# Patient Record
Sex: MALE | Race: WHITE | NOT HISPANIC OR LATINO | Employment: STUDENT | URBAN - METROPOLITAN AREA
[De-identification: names, ages, dates, MRNs, and addresses within clinical notes are randomized per-mention and may not be internally consistent; named-entity substitution may affect disease eponyms.]

---

## 2023-06-16 ENCOUNTER — OFFICE VISIT (OUTPATIENT)
Dept: PRIMARY CARE | Facility: CLINIC | Age: 23
End: 2023-06-16
Payer: COMMERCIAL

## 2023-06-16 VITALS
RESPIRATION RATE: 16 BRPM | SYSTOLIC BLOOD PRESSURE: 115 MMHG | HEART RATE: 88 BPM | OXYGEN SATURATION: 98 % | TEMPERATURE: 98 F | DIASTOLIC BLOOD PRESSURE: 75 MMHG

## 2023-06-16 DIAGNOSIS — Z86.19 FREQUENT INFECTIONS: Primary | ICD-10-CM

## 2023-06-16 DIAGNOSIS — R13.10 ODYNOPHAGIA: ICD-10-CM

## 2023-06-16 PROBLEM — Z00.00 WELL ADULT EXAM: Status: ACTIVE | Noted: 2023-06-16

## 2023-06-16 PROBLEM — J30.9 ALLERGIC RHINITIS: Status: ACTIVE | Noted: 2023-06-16

## 2023-06-16 PROBLEM — F41.0 PANIC ATTACK: Status: ACTIVE | Noted: 2023-06-16

## 2023-06-16 PROBLEM — R19.7 DIARRHEA: Status: RESOLVED | Noted: 2023-06-16 | Resolved: 2023-06-16

## 2023-06-16 PROBLEM — J45.909 ASTHMA (HHS-HCC): Status: ACTIVE | Noted: 2023-06-16

## 2023-06-16 LAB
ALANINE AMINOTRANSFERASE (SGPT) (U/L) IN SER/PLAS: 27 U/L (ref 10–52)
ALBUMIN (G/DL) IN SER/PLAS: 4.6 G/DL (ref 3.4–5)
ALKALINE PHOSPHATASE (U/L) IN SER/PLAS: 81 U/L (ref 33–120)
ANION GAP IN SER/PLAS: 13 MMOL/L (ref 10–20)
ASPARTATE AMINOTRANSFERASE (SGOT) (U/L) IN SER/PLAS: 24 U/L (ref 9–39)
BILIRUBIN TOTAL (MG/DL) IN SER/PLAS: 0.3 MG/DL (ref 0–1.2)
CALCIUM (MG/DL) IN SER/PLAS: 9.5 MG/DL (ref 8.6–10.3)
CARBON DIOXIDE, TOTAL (MMOL/L) IN SER/PLAS: 27 MMOL/L (ref 21–32)
CHLORIDE (MMOL/L) IN SER/PLAS: 99 MMOL/L (ref 98–107)
CREATININE (MG/DL) IN SER/PLAS: 1.05 MG/DL (ref 0.5–1.3)
ERYTHROCYTE DISTRIBUTION WIDTH (RATIO) BY AUTOMATED COUNT: 11.5 % (ref 11.5–14.5)
ERYTHROCYTE MEAN CORPUSCULAR HEMOGLOBIN CONCENTRATION (G/DL) BY AUTOMATED: 33.1 G/DL (ref 32–36)
ERYTHROCYTE MEAN CORPUSCULAR VOLUME (FL) BY AUTOMATED COUNT: 90 FL (ref 80–100)
ERYTHROCYTES (10*6/UL) IN BLOOD BY AUTOMATED COUNT: 5.42 X10E12/L (ref 4.5–5.9)
GFR MALE: >90 ML/MIN/1.73M2
GLUCOSE (MG/DL) IN SER/PLAS: 82 MG/DL (ref 74–99)
HEMATOCRIT (%) IN BLOOD BY AUTOMATED COUNT: 48.9 % (ref 41–52)
HEMOGLOBIN (G/DL) IN BLOOD: 16.2 G/DL (ref 13.5–17.5)
LEUKOCYTES (10*3/UL) IN BLOOD BY AUTOMATED COUNT: 17.3 X10E9/L (ref 4.4–11.3)
PLATELETS (10*3/UL) IN BLOOD AUTOMATED COUNT: 328 X10E9/L (ref 150–450)
POTASSIUM (MMOL/L) IN SER/PLAS: 4 MMOL/L (ref 3.5–5.3)
PROTEIN TOTAL: 7.4 G/DL (ref 6.4–8.2)
SODIUM (MMOL/L) IN SER/PLAS: 135 MMOL/L (ref 136–145)
UREA NITROGEN (MG/DL) IN SER/PLAS: 22 MG/DL (ref 6–23)

## 2023-06-16 PROCEDURE — 85027 COMPLETE CBC AUTOMATED: CPT

## 2023-06-16 PROCEDURE — 99214 OFFICE O/P EST MOD 30 MIN: CPT | Performed by: FAMILY MEDICINE

## 2023-06-16 PROCEDURE — 1036F TOBACCO NON-USER: CPT | Performed by: FAMILY MEDICINE

## 2023-06-16 PROCEDURE — 82784 ASSAY IGA/IGD/IGG/IGM EACH: CPT

## 2023-06-16 PROCEDURE — 80053 COMPREHEN METABOLIC PANEL: CPT

## 2023-06-16 RX ORDER — BIOTIN 10 MG
TABLET ORAL
COMMUNITY
Start: 2021-08-18

## 2023-06-16 RX ORDER — CLONAZEPAM 0.25 MG/1
0.25 TABLET, ORALLY DISINTEGRATING ORAL
COMMUNITY
Start: 2013-02-20

## 2023-06-16 RX ORDER — UBIDECARENONE 30 MG
1 CAPSULE ORAL DAILY
COMMUNITY
Start: 2021-08-18

## 2023-06-16 RX ORDER — FLUTICASONE PROPIONATE AND SALMETEROL 250; 50 UG/1; UG/1
1 POWDER RESPIRATORY (INHALATION) DAILY
COMMUNITY
Start: 2018-01-17

## 2023-06-16 RX ORDER — FLUCONAZOLE 100 MG/1
100 TABLET ORAL DAILY
Qty: 10 TABLET | Refills: 0 | Status: SHIPPED | OUTPATIENT
Start: 2023-06-16 | End: 2023-06-26

## 2023-06-16 RX ORDER — OMEPRAZOLE 40 MG/1
40 CAPSULE, DELAYED RELEASE ORAL
Qty: 30 CAPSULE | Refills: 2 | Status: SHIPPED | OUTPATIENT
Start: 2023-06-16 | End: 2023-08-29

## 2023-06-16 RX ORDER — LEVALBUTEROL TARTRATE 45 UG/1
2 AEROSOL, METERED ORAL EVERY 6 HOURS PRN
COMMUNITY

## 2023-06-16 NOTE — ASSESSMENT & PLAN NOTE
Treat with diflucan 100 mg daily x 7 days to cover possible thrush following URI, and PPI (omeprazole 40 mg daily) x 4 weeks -- can stop after 2 weeks if completely resolved.

## 2023-06-16 NOTE — PROGRESS NOTES
"Subjective   Patient ID: Vijay Oscar is a 22 y.o. male who presents for URI (X 2 weeks).    Started to improve but then started getting worse -- swelling in central chest -- feels like pressure in his esophagus.  Like food or a rock stuck in his throat.      [facility] and was told he had bronchitis -- no new meds.     Last used advair in February 2023 during respiratory chest cold.     Feels nauseated.    Has been using azelastine nasal spray.      Eating well and working out regularly.  Feels like he's getting sick too often.       Objective   Visit Vitals  /75 (BP Location: Left arm, Patient Position: Sitting, BP Cuff Size: Adult)   Pulse 88   Temp 36.7 °C (98 °F) (Temporal)   Resp 16   SpO2 98%   Smoking Status Never      O: VSS AFEB Awake, Alert, NAD.  No intoxication, withdrawal, or sedation.  Chest CTA.  Heart RRR.  Ext no c/c/e.   Abd soft, nt, nd.  OP erythema and raw posterior pharynx.     Lab Results   Component Value Date    WBC 12.9 (H) 05/08/2022    HGB 16.4 05/08/2022    HCT 46.9 05/08/2022    MCV 88 05/08/2022     05/08/2022       Chemistry    Lab Results   Component Value Date/Time     05/08/2022 2115    K 4.0 05/08/2022 2115    CL 99 05/08/2022 2115    CO2 31 05/08/2022 2115    BUN 18 05/08/2022 2115    CREATININE 0.90 05/08/2022 2115    Lab Results   Component Value Date/Time    CALCIUM 10.1 05/08/2022 2115    ALKPHOS 61 05/08/2022 2115    AST 19 05/08/2022 2115    ALT 35 05/08/2022 2115    BILITOT 0.4 05/08/2022 2115          No results found for: \"CHOL\"  No results found for: \"HDL\"  No results found for: \"LDLCALC\"  No results found for: \"TRIG\"  No components found for: \"CHOLHDL\"   No results found for: \"HGBA1C\"    Assessment/Plan   Problem List Items Addressed This Visit       Odynophagia    Overview     Onset following URI 6/2023         Current Assessment & Plan     Treat with diflucan 100 mg daily x 7 days to cover possible thrush following URI, and " PPI (omeprazole 40 mg daily) x 4 weeks -- can stop after 2 weeks if completely resolved.           Other Visit Diagnoses       Frequent infections    -  Primary    Check Immunoglobulins.  Check labs.  OK to continue vitamin C and probiotics.

## 2023-06-17 LAB
IGA (MG/DL) IN SER/PLAS: 231 MG/DL (ref 70–400)
IGG (MG/DL) IN SER/PLAS: 1390 MG/DL (ref 700–1600)
IGM (MG/DL) IN SER/PLAS: 84 MG/DL (ref 40–230)

## 2023-08-29 ENCOUNTER — OFFICE VISIT (OUTPATIENT)
Dept: PRIMARY CARE | Facility: CLINIC | Age: 23
End: 2023-08-29
Payer: COMMERCIAL

## 2023-08-29 VITALS
SYSTOLIC BLOOD PRESSURE: 112 MMHG | RESPIRATION RATE: 16 BRPM | HEIGHT: 66 IN | OXYGEN SATURATION: 98 % | TEMPERATURE: 98.2 F | HEART RATE: 68 BPM | DIASTOLIC BLOOD PRESSURE: 62 MMHG | WEIGHT: 164 LBS | BODY MASS INDEX: 26.36 KG/M2

## 2023-08-29 DIAGNOSIS — F41.0 PANIC ATTACK: ICD-10-CM

## 2023-08-29 DIAGNOSIS — Z00.00 WELL ADULT EXAM: Primary | ICD-10-CM

## 2023-08-29 DIAGNOSIS — Z79.899 MEDICATION MANAGEMENT: ICD-10-CM

## 2023-08-29 DIAGNOSIS — K52.9 GASTROENTERITIS: ICD-10-CM

## 2023-08-29 DIAGNOSIS — J45.20 MILD INTERMITTENT ASTHMA, UNSPECIFIED WHETHER COMPLICATED (HHS-HCC): ICD-10-CM

## 2023-08-29 PROBLEM — R13.10 ODYNOPHAGIA: Status: RESOLVED | Noted: 2023-06-16 | Resolved: 2023-08-29

## 2023-08-29 PROCEDURE — 80373 DRUG SCREENING TRAMADOL: CPT

## 2023-08-29 PROCEDURE — 80361 OPIATES 1 OR MORE: CPT

## 2023-08-29 PROCEDURE — 80368 SEDATIVE HYPNOTICS: CPT

## 2023-08-29 PROCEDURE — 1036F TOBACCO NON-USER: CPT | Performed by: FAMILY MEDICINE

## 2023-08-29 PROCEDURE — 80365 DRUG SCREENING OXYCODONE: CPT

## 2023-08-29 PROCEDURE — 80358 DRUG SCREENING METHADONE: CPT

## 2023-08-29 PROCEDURE — 80346 BENZODIAZEPINES1-12: CPT

## 2023-08-29 PROCEDURE — 99395 PREV VISIT EST AGE 18-39: CPT | Performed by: FAMILY MEDICINE

## 2023-08-29 PROCEDURE — 80354 DRUG SCREENING FENTANYL: CPT

## 2023-08-29 PROCEDURE — 80307 DRUG TEST PRSMV CHEM ANLYZR: CPT

## 2023-08-29 ASSESSMENT — ENCOUNTER SYMPTOMS
ADENOPATHY: 0
HALLUCINATIONS: 0
DYSURIA: 0
COUGH: 0
SHORTNESS OF BREATH: 0
EYE PAIN: 0
BLOOD IN STOOL: 0
NECK STIFFNESS: 0
FEVER: 0
PALPITATIONS: 0
FATIGUE: 0
WOUND: 0
POLYDIPSIA: 0
BACK PAIN: 0
CHILLS: 0
ABDOMINAL PAIN: 0
HEADACHES: 0
WEAKNESS: 0
RHINORRHEA: 0
BRUISES/BLEEDS EASILY: 0
SORE THROAT: 0
EYE REDNESS: 0
HEMATURIA: 0

## 2023-08-29 NOTE — PROGRESS NOTES
Vijay Oscar is a 22 y.o. male with Chief Complaint of CPE.  And recheck on prn anxiety and  asthma.    OARRS:  Wilfrid Horta MD on 8/29/2023 10:18 AM  I have personally reviewed the OARRS report for Vijay Oscar. I have considered the risks of abuse, dependence, addiction and diversion and I believe that it is clinically appropriate for Vijay Oscar to be prescribed this medication    Is the patient prescribed a combination of a benzodiazepine and opioid?  No    Last Urine Drug Screen / ordered today: Yes  No results found for this or any previous visit (from the past 8760 hour(s)).  N/A  Clinical rationale for not completing a Urine Drug Screen:  completed    Controlled Substance Agreement:  Date of the Last Agreement: 8/29/23  Reviewed Controlled Substance Agreement including but not limited to the benefits, risks, and alternatives to treatment with a Controlled Substance medication(s).    Benzodiazepines:  What is the patient's goal of therapy? Alleviation of rare panic attack.   Is this being achieved with current treatment? yes    HARVEY-7:  No data recorded  No chronic anxiety symptoms.     Activities of Daily Living:   Is your overall impression that this patient is benefiting (symptom reduction outweighs side effects) from benzodiazepine therapy? Yes     1. Physical Functioning: Same  2. Family Relationship: Same  3. Social Relationship: Same  4. Mood: Same  5. Sleep Patterns: Same  6. Overall Function: Better      Past Surgical History:   Procedure Laterality Date    OTHER SURGICAL HISTORY  08/19/2022    Appendectomy    OTHER SURGICAL HISTORY  08/18/2021    [place] tooth extraction      Social History     Socioeconomic History    Marital status: Single     Spouse name: Not on file    Number of children: Not on file    Years of education: Not on file    Highest education level: Not on file   Occupational History    Not on file   Tobacco Use    Smoking status: Never     Passive exposure:  Never    Smokeless tobacco: Never   Vaping Use    Vaping Use: Never used   Substance and Sexual Activity    Alcohol use: Never    Drug use: Never    Sexual activity: Yes     Partners: Female   Other Topics Concern    Not on file   Social History Narrative    Not on file     Social Determinants of Health     Financial Resource Strain: Not on file   Food Insecurity: Not on file   Transportation Needs: Not on file   Physical Activity: Not on file   Stress: Not on file   Social Connections: Not on file   Intimate Partner Violence: Not on file   Housing Stability: Not on file     Past Medical History:   Diagnosis Date    Contusion of left front wall of thorax, initial encounter 07/20/2018    Contusion of chest wall, left, initial encounter    COVID-19 11/13/2020    COVID-19 virus infection    Fracture of one rib, left side, initial encounter for closed fracture 07/20/2018    Closed fracture of one rib of left side, initial encounter    Hypertrophy of breast 12/10/2014    Subareolar gynecomastia in male    Influenza due to other identified influenza virus with other respiratory manifestations 02/15/2018    Influenza B    Laceration without foreign body of right index finger without damage to nail, initial encounter 03/04/2020    Laceration of right index finger    Odynophagia 06/16/2023    Onset following URI 6/2023  Resolved with diflucan and omeprazole.     Other conditions influencing health status     History of cough    Other injury of unspecified body region, initial encounter 05/15/2014    Fracture    Panic disorder (episodic paroxysmal anxiety) 06/03/2013    Panic disorder without agoraphobia    Personal history of diseases of the skin and subcutaneous tissue 01/23/2019    History of impetigo    Personal history of other diseases of the musculoskeletal system and connective tissue 09/27/2016    History of muscle spasm    Personal history of other diseases of the respiratory system 11/25/2013    History of acute  "sinusitis    Personal history of other diseases of the respiratory system 04/05/2018    History of acute sinusitis    Personal history of other diseases of the respiratory system 02/15/2018    History of influenza    Personal history of other diseases of the respiratory system 02/15/2018    History of sore throat    Personal history of other infectious and parasitic diseases 08/12/2020    History of viral warts    Personal history of other mental and behavioral disorders 02/24/2015    History of anxiety    Pleurodynia     Rib pain    Streptococcal pharyngitis 06/03/2013    Pharyngitis due to group A beta hemolytic Streptococci    Unspecified contact dermatitis due to plants, except food 06/03/2013    Contact dermatitis due to plants, except food      No family history on file.     Review of Systems   Constitutional:  Negative for chills, fatigue and fever.   HENT:  Negative for rhinorrhea and sore throat.    Eyes:  Negative for pain and redness.   Respiratory:  Negative for cough and shortness of breath.    Cardiovascular:  Negative for chest pain and palpitations.   Gastrointestinal:  Negative for abdominal pain and blood in stool.   Endocrine: Negative for polydipsia and polyuria.   Genitourinary:  Negative for dysuria and hematuria.   Musculoskeletal:  Negative for back pain and neck stiffness.   Skin:  Negative for rash and wound.   Allergic/Immunologic: Negative for environmental allergies and food allergies.   Neurological:  Negative for weakness and headaches.   Hematological:  Negative for adenopathy. Does not bruise/bleed easily.   Psychiatric/Behavioral:  Negative for hallucinations and suicidal ideas.       /62   Pulse 68   Temp 36.8 °C (98.2 °F)   Resp 16   Ht 1.676 m (5' 6\")   Wt 74.4 kg (164 lb)   SpO2 98%   BMI 26.47 kg/m²   Physical Exam  Vitals reviewed.   Constitutional:       General: He is not in acute distress.     Appearance: He is not ill-appearing.   HENT:      Head: " "Normocephalic and atraumatic.      Right Ear: Tympanic membrane normal.      Left Ear: Tympanic membrane normal.      Nose: No congestion or rhinorrhea.      Mouth/Throat:      Pharynx: No oropharyngeal exudate or posterior oropharyngeal erythema.   Eyes:      Extraocular Movements: Extraocular movements intact.      Conjunctiva/sclera: Conjunctivae normal.      Pupils: Pupils are equal, round, and reactive to light.   Cardiovascular:      Rate and Rhythm: Normal rate and regular rhythm.      Heart sounds: No murmur heard.     No friction rub. No gallop.   Pulmonary:      Effort: Pulmonary effort is normal.      Breath sounds: Normal breath sounds. No wheezing, rhonchi or rales.   Abdominal:      General: There is no distension.      Palpations: Abdomen is soft.      Tenderness: There is no abdominal tenderness. There is no guarding or rebound.   Musculoskeletal:         General: No swelling or deformity.      Cervical back: Normal range of motion and neck supple.      Right lower leg: No edema.      Left lower leg: No edema.   Skin:     Capillary Refill: Capillary refill takes less than 2 seconds.      Coloration: Skin is not jaundiced.      Findings: No rash.   Neurological:      General: No focal deficit present.      Mental Status: He is alert.      Motor: No weakness.   Psychiatric:         Mood and Affect: Mood normal.         Behavior: Behavior normal.       Lab Results   Component Value Date    WBC 17.3 (H) 06/16/2023    HGB 16.2 06/16/2023    HCT 48.9 06/16/2023    MCV 90 06/16/2023     06/16/2023     No results found for: \"CHOL\"  No results found for: \"HDL\"  No results found for: \"LDLCALC\"  No results found for: \"TRIG\"  No components found for: \"CHOLHDL\"  No results found for: \"HGBA1C\"    Assessment/Plan   Problem List Items Addressed This Visit       Asthma    Overview     Mild intermittent asthma during summer but mild persistent in winter.           Current Assessment & Plan     Continue advair " for prevention during winter.  Prn xoponex.          Panic attack    Overview     Clonazepam #10 last provided 2022 -- only single use in past year but didn't help that much.   OARRS reviewed 8/19/2022, 6/15/23, 8/29/23 (last fill 8/19/2022)  TOX screen 8/19/22, sent 8/29/23   [facility] agreement signed 8/19/2022, 8/29/23         Well adult exam - Primary    Overview     8/29/23 Preventative Exam -- Well Teen -- counseled on safe sex, avoiding alcohol/tobacco/drugs.  Welding for Lubrizol. Tdap booster due 2025.     # Hx mild iBS symptoms -- rare diarrhea now.   # Hx B/l knee bursitis (prepatellar on right, and anserine on the left) -- rarely bothers him, improved with ice, rest, ibuprofen after practice, and  knee pads for cushinoing during practice.   # Hx appy 5/2022.   # Left pinky deformation -- failed pinning/reconstruction. Not interested in further work right now.   # Hx Right index finger lac from 2/26/2020 -- healed.  # Hx COVID-19 infection -- 10/2020 resolved -- patient without dyspnea, chest pain, exercise intolerance, and never had symptoms other than brief leg aches. EKG normal.  # hx Viral wart -- left elbow -- treated x 2 with verucca freeze 8/12/2020.     # Right plantar wart 8/2023 -- counseled on paring and otc salicylic acid products.  If no better can freeze or refer to [facility].           Other Visit Diagnoses       Gastroenteritis        After drinking soured milk 8/28/23 -- emesis x 4 then resolved.  RTW today having missed yesterday.

## 2023-08-29 NOTE — LETTER
August 29, 2023     Patient: Vijay Oscar   YOB: 2000   Date of Visit: 8/29/2023       To Whom It May Concern:    Vijay Oscar was seen in my clinic on 8/29/2023 at 9:20 am. Please excuse Vijay for his absence from work on this day to make the appointment.    If you have any questions or concerns, please don't hesitate to call.         Sincerely,         Wilfrid Horta MD        CC: No Recipients

## 2023-09-01 LAB
6-ACETYLMORPHINE: <25 NG/ML
7-AMINOCLONAZEPAM: 28 NG/ML
ALPHA-HYDROXYALPRAZOLAM: <25 NG/ML
ALPHA-HYDROXYMIDAZOLAM: <25 NG/ML
ALPRAZOLAM: <25 NG/ML
AMPHETAMINE (PRESENCE) IN URINE BY SCREEN METHOD: ABNORMAL
BARBITURATES PRESENCE IN URINE BY SCREEN METHOD: ABNORMAL
CANNABINOIDS IN URINE BY SCREEN METHOD: ABNORMAL
CHLORDIAZEPOXIDE: <25 NG/ML
CLONAZEPAM: <25 NG/ML
COCAINE (PRESENCE) IN URINE BY SCREEN METHOD: ABNORMAL
CODEINE: <50 NG/ML
CREATINE, URINE FOR DRUG: 139.1 MG/DL
DIAZEPAM: <25 NG/ML
DRUG SCREEN COMMENT URINE: ABNORMAL
EDDP: <25 NG/ML
FENTANYL CONFIRMATION, URINE: <2.5 NG/ML
HYDROCODONE: <25 NG/ML
HYDROMORPHONE: <25 NG/ML
LORAZEPAM: <25 NG/ML
METHADONE CONFIRMATION,URINE: <25 NG/ML
MIDAZOLAM: <25 NG/ML
MORPHINE URINE: <50 NG/ML
NORDIAZEPAM: <25 NG/ML
NORFENTANYL: <2.5 NG/ML
NORHYDROCODONE: <25 NG/ML
NOROXYCODONE: <25 NG/ML
O-DESMETHYLTRAMADOL: <50 NG/ML
OXAZEPAM: <25 NG/ML
OXYCODONE: <25 NG/ML
OXYMORPHONE: <25 NG/ML
PHENCYCLIDINE (PRESENCE) IN URINE BY SCREEN METHOD: ABNORMAL
TEMAZEPAM: <25 NG/ML
TRAMADOL: <50 NG/ML
ZOLPIDEM METABOLITE (ZCA): <25 NG/ML
ZOLPIDEM: <25 NG/ML

## 2024-09-17 ENCOUNTER — APPOINTMENT (OUTPATIENT)
Dept: PRIMARY CARE | Facility: CLINIC | Age: 24
End: 2024-09-17
Payer: COMMERCIAL

## 2024-12-20 ENCOUNTER — APPOINTMENT (OUTPATIENT)
Dept: PRIMARY CARE | Facility: CLINIC | Age: 24
End: 2024-12-20
Payer: COMMERCIAL

## 2025-01-10 ENCOUNTER — OFFICE VISIT (OUTPATIENT)
Dept: PRIMARY CARE | Facility: CLINIC | Age: 25
End: 2025-01-10
Payer: COMMERCIAL

## 2025-01-10 VITALS
TEMPERATURE: 98.5 F | HEIGHT: 66 IN | SYSTOLIC BLOOD PRESSURE: 122 MMHG | OXYGEN SATURATION: 96 % | BODY MASS INDEX: 26.16 KG/M2 | DIASTOLIC BLOOD PRESSURE: 84 MMHG | HEART RATE: 74 BPM | WEIGHT: 162.8 LBS

## 2025-01-10 DIAGNOSIS — K64.8 INTERNAL HEMORRHOID, BLEEDING: Primary | ICD-10-CM

## 2025-01-10 PROCEDURE — 99213 OFFICE O/P EST LOW 20 MIN: CPT | Performed by: FAMILY MEDICINE

## 2025-01-10 PROCEDURE — 3008F BODY MASS INDEX DOCD: CPT | Performed by: FAMILY MEDICINE

## 2025-01-10 RX ORDER — HYDROCORTISONE 25 MG/G
CREAM TOPICAL 2 TIMES DAILY
Qty: 28 G | Refills: 2 | Status: SHIPPED | OUTPATIENT
Start: 2025-01-10 | End: 2025-02-09

## 2025-01-10 ASSESSMENT — PATIENT HEALTH QUESTIONNAIRE - PHQ9
SUM OF ALL RESPONSES TO PHQ9 QUESTIONS 1 AND 2: 0
1. LITTLE INTEREST OR PLEASURE IN DOING THINGS: NOT AT ALL
2. FEELING DOWN, DEPRESSED OR HOPELESS: NOT AT ALL

## 2025-01-10 ASSESSMENT — PAIN SCALES - GENERAL: PAINLEVEL_OUTOF10: 0-NO PAIN

## 2025-01-10 NOTE — ASSESSMENT & PLAN NOTE
Treat with HC2.5% cream bid x up to 30 days.  If symptoms worsen or do not improve will refer to Dr Lopez for scope & banding.

## 2025-01-10 NOTE — PROGRESS NOTES
"Subjective   Patient ID: Vijay Oscar is a 24 y.o. male who presents for Rectal Bleeding (Pt noticed 4 days ago).    Started around new years -- after wiping gets BRB and drips into toilet bowl.  Not in stool.      No melena, no hematochezia.  No pain.  No diarrhea or constipation.       Objective   Visit Vitals  /84   Pulse 74   Temp 36.9 °C (98.5 °F)   Ht 1.676 m (5' 6\")   Wt 73.8 kg (162 lb 12.8 oz)   SpO2 96%   BMI 26.28 kg/m²   Smoking Status Never   BSA 1.85 m²      O: VSS AFEB Awake, Alert, NAD.  No intoxication, withdrawal, or sedation.     Lab Results   Component Value Date    WBC 17.3 (H) 06/16/2023    HGB 16.2 06/16/2023    HCT 48.9 06/16/2023    MCV 90 06/16/2023     06/16/2023       Chemistry    Lab Results   Component Value Date/Time     (L) 06/16/2023 1413    K 4.0 06/16/2023 1413    CL 99 06/16/2023 1413    CO2 27 06/16/2023 1413    BUN 22 06/16/2023 1413    CREATININE 1.05 06/16/2023 1413    Lab Results   Component Value Date/Time    CALCIUM 9.5 06/16/2023 1413    ALKPHOS 81 06/16/2023 1413    AST 24 06/16/2023 1413    ALT 27 06/16/2023 1413    BILITOT 0.3 06/16/2023 1413          No results found for: \"CHOL\"  No results found for: \"HDL\"  No results found for: \"LDLCALC\"  No results found for: \"TRIG\"  No components found for: \"CHOLHDL\"   No results found for: \"HGBA1C\"    Assessment/Plan   Problem List Items Addressed This Visit       Internal hemorrhoid, bleeding - Primary    Overview     Onset 1/1/25  No red flags.          Current Assessment & Plan     Treat with HC2.5% cream bid x up to 30 days.  If symptoms worsen or do not improve will refer to Dr Lopez for scope & banding.          Relevant Medications    hydrocortisone (Anusol-HC) 2.5 % rectal cream        "

## 2025-01-10 NOTE — LETTER
January 10, 2025     Patient: Vijay Oscar   YOB: 2000   Date of Visit: 1/10/2025       To Whom It May Concern:    Vijay Oscar was seen in my clinic on 1/10/2025 at 2:00 pm. Please excuse Vijay for his absence from work on this day to make the appointment.    If you have any questions or concerns, please don't hesitate to call.         Sincerely,         Wilfrid Horta MD        CC: No Recipients

## 2025-01-14 ENCOUNTER — APPOINTMENT (OUTPATIENT)
Dept: PRIMARY CARE | Facility: CLINIC | Age: 25
End: 2025-01-14
Payer: COMMERCIAL

## 2025-07-28 ENCOUNTER — TELEPHONE (OUTPATIENT)
Dept: PRIMARY CARE | Facility: CLINIC | Age: 25
End: 2025-07-28
Payer: COMMERCIAL

## 2025-07-28 DIAGNOSIS — L23.7 POISON IVY: Primary | ICD-10-CM

## 2025-07-28 RX ORDER — PREDNISONE 20 MG/1
TABLET ORAL
Qty: 15 TABLET | Refills: 0 | Status: SHIPPED | OUTPATIENT
Start: 2025-07-28 | End: 2025-08-07

## 2025-07-28 NOTE — TELEPHONE ENCOUNTER
Tamara Oscar to [id number] (supporting Wilfrid Horta MD)        7/28/25  8:23 AM  He just texted and asked that I let you know , it is now on his face pretty bad. Thank you  Tamara Oscar to [id number] (supporting Wilfrid Horta MD)        7/28/25  6:31 AM  Dr. Horta,  Im reaching out on behalf of Vijay. He started with poison ivy yesterday on his forearm. In 24 hrs it has spread all over both arms and legs. Now hes starting to swell up. Never seen him react this badly to it before. He is working overtime all week so I dont think he can make an appointment,  is there anything you can give him for this.  He is absolutely miserable and a little stressed how fast its spreading and how bad its swelling.    Thank you for your time,  Tamara Oscar

## 2025-08-15 ENCOUNTER — TELEPHONE (OUTPATIENT)
Dept: PRIMARY CARE | Facility: CLINIC | Age: 25
End: 2025-08-15
Payer: COMMERCIAL